# Patient Record
Sex: FEMALE | Race: WHITE | Employment: OTHER | URBAN - METROPOLITAN AREA
[De-identification: names, ages, dates, MRNs, and addresses within clinical notes are randomized per-mention and may not be internally consistent; named-entity substitution may affect disease eponyms.]

---

## 2017-03-03 ENCOUNTER — HOSPITAL ENCOUNTER (OUTPATIENT)
Dept: MAMMOGRAPHY | Age: 67
Discharge: HOME | End: 2017-03-03
Admitting: OBSTETRICS & GYNECOLOGY

## 2017-03-03 DIAGNOSIS — Z12.31 ENCOUNTER FOR SCREENING MAMMOGRAM FOR BREAST CANCER: ICD-10-CM

## 2017-03-09 ENCOUNTER — HOSPITAL ENCOUNTER (OUTPATIENT)
Dept: MAMMOGRAPHY | Age: 67
Discharge: HOME | End: 2017-03-09
Admitting: OBSTETRICS & GYNECOLOGY

## 2017-03-09 ENCOUNTER — HOSPITAL ENCOUNTER (OUTPATIENT)
Dept: ULTRASOUND IMAGING | Age: 67
Discharge: HOME | End: 2017-03-09

## 2017-03-09 DIAGNOSIS — R92.8 ABNORMAL MAMMOGRAM, UNSPECIFIED: ICD-10-CM

## 2017-03-10 ENCOUNTER — OFFICE VISIT (OUTPATIENT)
Dept: ORTHOPEDIC SURGERY | Age: 67
End: 2017-03-10

## 2017-03-10 VITALS — DIASTOLIC BLOOD PRESSURE: 73 MMHG | HEART RATE: 72 BPM | SYSTOLIC BLOOD PRESSURE: 121 MMHG

## 2017-03-10 DIAGNOSIS — M79.642 PAIN OF LEFT HAND: Primary | ICD-10-CM

## 2017-03-10 DIAGNOSIS — M79.641 PAIN IN RIGHT HAND: ICD-10-CM

## 2017-03-10 DIAGNOSIS — M18.0 PRIMARY OSTEOARTHRITIS OF BOTH FIRST CARPOMETACARPAL JOINTS: ICD-10-CM

## 2017-03-10 PROCEDURE — 99203 OFFICE O/P NEW LOW 30 MIN: CPT | Performed by: ORTHOPAEDIC SURGERY

## 2017-03-10 PROCEDURE — 73130 X-RAY EXAM OF HAND: CPT | Performed by: ORTHOPAEDIC SURGERY

## 2017-03-10 PROCEDURE — L3918 METACARP FX ORTHOSIS PRE OTS: HCPCS | Performed by: ORTHOPAEDIC SURGERY

## 2018-04-16 ENCOUNTER — HOSPITAL ENCOUNTER (OUTPATIENT)
Dept: MAMMOGRAPHY | Age: 68
Discharge: HOME | End: 2018-04-16
Attending: INTERNAL MEDICINE | Admitting: OBSTETRICS & GYNECOLOGY

## 2018-04-16 DIAGNOSIS — Z12.39 BREAST CANCER SCREENING: ICD-10-CM

## 2019-07-09 ENCOUNTER — HOSPITAL ENCOUNTER (OUTPATIENT)
Dept: MAMMOGRAPHY | Age: 69
Discharge: HOME | End: 2019-07-09
Payer: MEDICARE

## 2019-07-09 DIAGNOSIS — Z12.39 BREAST CANCER SCREENING: ICD-10-CM

## 2019-07-09 PROCEDURE — 77063 BREAST TOMOSYNTHESIS BI: CPT

## 2020-09-28 ENCOUNTER — HOSPITAL ENCOUNTER (OUTPATIENT)
Dept: MAMMOGRAPHY | Age: 70
Discharge: HOME | End: 2020-09-28
Payer: MEDICARE

## 2020-09-28 PROCEDURE — 77067 SCR MAMMO BI INCL CAD: CPT

## 2022-12-03 ENCOUNTER — TELEPHONE (OUTPATIENT)
Dept: ENDOCRINOLOGY | Age: 72
End: 2022-12-03

## 2022-12-03 NOTE — TELEPHONE ENCOUNTER
New patient referral. Please send a Health History Form to return to my attention and let them know I will look for appt day and time after receiving it. Thanks. Referred by Dr. Jose Andrade  [facility])

## 2023-04-24 ENCOUNTER — HOSPITAL ENCOUNTER (OUTPATIENT)
Dept: CARDIAC REHAB | Age: 73
Setting detail: THERAPIES SERIES
Discharge: HOME | End: 2023-04-24
Payer: MEDICARE

## 2023-04-26 ENCOUNTER — HOSPITAL ENCOUNTER (OUTPATIENT)
Dept: CARDIAC REHAB | Age: 73
Setting detail: THERAPIES SERIES
Discharge: HOME | End: 2023-04-26
Payer: MEDICARE

## 2023-04-26 PROCEDURE — G0239 OTH RESP PROC, GROUP: HCPCS

## 2023-04-28 ENCOUNTER — HOSPITAL ENCOUNTER (OUTPATIENT)
Dept: CARDIAC REHAB | Age: 73
Setting detail: THERAPIES SERIES
Discharge: HOME | End: 2023-04-28
Payer: MEDICARE

## 2023-04-28 PROCEDURE — G0239 OTH RESP PROC, GROUP: HCPCS

## 2023-05-03 ENCOUNTER — HOSPITAL ENCOUNTER (OUTPATIENT)
Dept: CARDIAC REHAB | Age: 73
Setting detail: THERAPIES SERIES
Discharge: HOME | End: 2023-05-03
Payer: MEDICARE

## 2023-05-03 PROCEDURE — G0239 OTH RESP PROC, GROUP: HCPCS

## 2023-05-10 ENCOUNTER — APPOINTMENT (OUTPATIENT)
Dept: CARDIAC REHAB | Age: 73
End: 2023-05-10
Payer: MEDICARE

## 2023-05-12 ENCOUNTER — APPOINTMENT (OUTPATIENT)
Dept: CARDIAC REHAB | Age: 73
End: 2023-05-12
Payer: MEDICARE

## 2023-05-15 ENCOUNTER — APPOINTMENT (OUTPATIENT)
Dept: CARDIAC REHAB | Age: 73
End: 2023-05-15
Payer: MEDICARE

## 2023-05-17 ENCOUNTER — APPOINTMENT (OUTPATIENT)
Dept: CARDIAC REHAB | Age: 73
End: 2023-05-17
Payer: MEDICARE

## 2023-05-19 ENCOUNTER — APPOINTMENT (OUTPATIENT)
Dept: CARDIAC REHAB | Age: 73
End: 2023-05-19
Payer: MEDICARE

## 2023-05-22 ENCOUNTER — APPOINTMENT (OUTPATIENT)
Dept: CARDIAC REHAB | Age: 73
End: 2023-05-22
Payer: MEDICARE

## 2023-05-24 ENCOUNTER — APPOINTMENT (OUTPATIENT)
Dept: CARDIAC REHAB | Age: 73
End: 2023-05-24
Payer: MEDICARE

## 2023-05-26 ENCOUNTER — APPOINTMENT (OUTPATIENT)
Dept: CARDIAC REHAB | Age: 73
End: 2023-05-26
Payer: MEDICARE

## 2023-05-31 ENCOUNTER — APPOINTMENT (OUTPATIENT)
Dept: CARDIAC REHAB | Age: 73
End: 2023-05-31
Payer: MEDICARE

## 2024-03-06 RX ORDER — ALENDRONATE SODIUM 70 MG/1
70 TABLET ORAL
COMMUNITY

## 2024-03-06 RX ORDER — ACETAMINOPHEN 160 MG
2000 TABLET,DISINTEGRATING ORAL DAILY
COMMUNITY

## 2024-03-06 NOTE — PROGRESS NOTES
[facility] PRE-OPERATIVE INSTRUCTIONS     Arrival time: 0750  Surgery time:0920      Take the following medications with a sip of water:  Follow your MD/Surgeons pre-procedure instructions regarding your medications     Do not eat or drink anything after 12:00 midnight prior to your surgery.  This includes water chewing gum, mints and ice chips.   You may brush your teeth and gargle the morning of your surgery, but do not swallow the water     Please see your family doctor/pediatrician for a history and physical and/or concerning medications.   Bring any test results/reports from your physicians office.   If you are under the care of a heart doctor or specialist doctor, please be aware that you may be asked to them for clearance    You may be asked to stop blood thinners such as Coumadin, Plavix, Fragmin, Lovenox, etc., or any anti-inflammatories such as:  Aspirin, Ibuprofen, Advil, Naproxen prior to your surgery.    We also ask that you stop any OTC medications such as fish oil, vitamin E, glucosamine, garlic, Multivitamins, COQ 10, etc.    We ask that you do not smoke 24 hours prior to surgery  We ask that you do not  drink any alcoholic beverages 24 hours prior to surgery     You must make arrangements for a responsible adult to take you home after your surgery.    For your safety you will not be allowed to leave alone or drive yourself home.  Your surgery will be cancelled if you do not have a ride home.     Also for your safety, it is strongly suggested that someone stay with you the first 24 hours after your surgery.     A parent or legal guardian must accompany a child scheduled for surgery and plan to stay at the hospital until the child is discharged.    Please do not bring other children with you.    For your comfort, please wear simple loose fitting clothing to the hospital.  Please do not bring valuables.    Do not wear any make-up or nail polish on your fingers or toes      For your safety,

## 2024-03-13 ENCOUNTER — ANESTHESIA EVENT (OUTPATIENT)
Dept: SURGERY | Age: 74
End: 2024-03-13
Payer: MEDICARE

## 2024-03-14 NOTE — PERIOP NOTE
[address] [facility] [address] [address]  [phone]        Pre-Op Phone Call:     Patient Name: Katia Soler     Telephone Information:   Mobile [phone]     Home phone:  [phone]    Surgery Time:    8:50 AM     Arrival Time:  7:20     Left extended Message:  Yes     Message left with: Spoke with patient     Recent change in health status:  No     Advised of transportation/ policy:  Yes     NPO policy reviewed:  Yes     Advised to take morning heart/blood pressure medications with sips of water morning of surgery?  Yes     Instructed to bring eye drops, photo identification, and insurance card day of surgery?  Yes     Advised to wear short sleeved button down shirt (no T-shirt underneath):  Yes     Advised not to wear jewelry, hairpins, or pantyhose day of surgery?  Yes     Advised not to wear make-up and to wash face day of surgery?  Yes    Remarks: Spoke with patient         Electronically signed by:  Janny Carrillo RN at 3/14/2024 10:45 AM

## 2024-03-15 ENCOUNTER — PREP FOR PROCEDURE (OUTPATIENT)
Dept: OPHTHALMOLOGY | Age: 74
End: 2024-03-15

## 2024-03-15 ENCOUNTER — HOSPITAL ENCOUNTER (OUTPATIENT)
Age: 74
Setting detail: OUTPATIENT SURGERY
Discharge: HOME | End: 2024-03-15
Attending: OPHTHALMOLOGY | Admitting: OPHTHALMOLOGY
Payer: MEDICARE

## 2024-03-15 ENCOUNTER — ANESTHESIA (OUTPATIENT)
Dept: SURGERY | Age: 74
End: 2024-03-15
Payer: MEDICARE

## 2024-03-15 VITALS
BODY MASS INDEX: 21.22 KG/M2 | OXYGEN SATURATION: 100 % | RESPIRATION RATE: 14 BRPM | WEIGHT: 140 LBS | HEIGHT: 68 IN | HEART RATE: 68 BPM | DIASTOLIC BLOOD PRESSURE: 81 MMHG | TEMPERATURE: 97.2 F | SYSTOLIC BLOOD PRESSURE: 137 MMHG

## 2024-03-15 PROBLEM — H25.12 NUCLEAR SCLEROTIC CATARACT OF LEFT EYE: Status: RESOLVED | Noted: 2024-03-15 | Resolved: 2024-03-15

## 2024-03-15 PROBLEM — H25.12 NUCLEAR SCLEROTIC CATARACT OF LEFT EYE: Status: ACTIVE | Noted: 2024-03-15

## 2024-03-15 PROCEDURE — 6360000002 HC RX W HCPCS: Performed by: NURSE ANESTHETIST, CERTIFIED REGISTERED

## 2024-03-15 PROCEDURE — 3700000001 HC ADD 15 MINUTES (ANESTHESIA): Performed by: OPHTHALMOLOGY

## 2024-03-15 PROCEDURE — 7100000010 HC PHASE II RECOVERY - FIRST 15 MIN: Performed by: OPHTHALMOLOGY

## 2024-03-15 PROCEDURE — 2580000003 HC RX 258: Performed by: OPHTHALMOLOGY

## 2024-03-15 PROCEDURE — 6360000002 HC RX W HCPCS: Performed by: OPHTHALMOLOGY

## 2024-03-15 PROCEDURE — 2709999900 HC NON-CHARGEABLE SUPPLY: Performed by: OPHTHALMOLOGY

## 2024-03-15 PROCEDURE — 6370000000 HC RX 637 (ALT 250 FOR IP): Performed by: OPHTHALMOLOGY

## 2024-03-15 PROCEDURE — 2500000003 HC RX 250 WO HCPCS: Performed by: OPHTHALMOLOGY

## 2024-03-15 PROCEDURE — 3700000000 HC ANESTHESIA ATTENDED CARE: Performed by: OPHTHALMOLOGY

## 2024-03-15 PROCEDURE — 3600000004 HC SURGERY LEVEL 4 BASE: Performed by: OPHTHALMOLOGY

## 2024-03-15 PROCEDURE — V2632 POST CHMBR INTRAOCULAR LENS: HCPCS | Performed by: OPHTHALMOLOGY

## 2024-03-15 PROCEDURE — 3600000014 HC SURGERY LEVEL 4 ADDTL 15MIN: Performed by: OPHTHALMOLOGY

## 2024-03-15 RX ORDER — TETRACAINE HYDROCHLORIDE 5 MG/ML
SOLUTION OPHTHALMIC
Status: COMPLETED | OUTPATIENT
Start: 2024-03-15 | End: 2024-03-15

## 2024-03-15 RX ORDER — PHENYLEPHRINE HYDROCHLORIDE 25 MG/ML
1 SOLUTION/ DROPS OPHTHALMIC SEE ADMIN INSTRUCTIONS
Status: COMPLETED | OUTPATIENT
Start: 2024-03-15 | End: 2024-03-15

## 2024-03-15 RX ORDER — SODIUM CHLORIDE 0.9 % (FLUSH) 0.9 %
5-40 SYRINGE (ML) INJECTION PRN
Status: CANCELLED | OUTPATIENT
Start: 2024-03-15

## 2024-03-15 RX ORDER — SODIUM CHLORIDE 9 MG/ML
INJECTION, SOLUTION INTRAVENOUS PRN
Status: DISCONTINUED | OUTPATIENT
Start: 2024-03-15 | End: 2024-03-15 | Stop reason: HOSPADM

## 2024-03-15 RX ORDER — SODIUM CHLORIDE 0.9 % (FLUSH) 0.9 %
5-40 SYRINGE (ML) INJECTION PRN
Status: DISCONTINUED | OUTPATIENT
Start: 2024-03-15 | End: 2024-03-15 | Stop reason: HOSPADM

## 2024-03-15 RX ORDER — PHENYLEPHRINE HYDROCHLORIDE 25 MG/ML
1 SOLUTION/ DROPS OPHTHALMIC SEE ADMIN INSTRUCTIONS
Status: CANCELLED | OUTPATIENT
Start: 2024-03-15

## 2024-03-15 RX ORDER — OFLOXACIN 3 MG/ML
SOLUTION/ DROPS OPHTHALMIC
Status: COMPLETED | OUTPATIENT
Start: 2024-03-15 | End: 2024-03-15

## 2024-03-15 RX ORDER — ONDANSETRON 2 MG/ML
4 INJECTION INTRAMUSCULAR; INTRAVENOUS
Status: CANCELLED | OUTPATIENT
Start: 2024-03-15 | End: 2024-03-16

## 2024-03-15 RX ORDER — LIDOCAINE HYDROCHLORIDE 10 MG/ML
INJECTION, SOLUTION EPIDURAL; INFILTRATION; INTRACAUDAL; PERINEURAL
Status: COMPLETED | OUTPATIENT
Start: 2024-03-15 | End: 2024-03-15

## 2024-03-15 RX ORDER — MIDAZOLAM HYDROCHLORIDE 1 MG/ML
INJECTION INTRAMUSCULAR; INTRAVENOUS PRN
Status: DISCONTINUED | OUTPATIENT
Start: 2024-03-15 | End: 2024-03-15 | Stop reason: SDUPTHER

## 2024-03-15 RX ORDER — CIPROFLOXACIN HYDROCHLORIDE 3.5 MG/ML
1 SOLUTION/ DROPS TOPICAL SEE ADMIN INSTRUCTIONS
Status: COMPLETED | OUTPATIENT
Start: 2024-03-15 | End: 2024-03-15

## 2024-03-15 RX ORDER — SODIUM CHLORIDE 9 MG/ML
INJECTION, SOLUTION INTRAVENOUS PRN
Status: CANCELLED | OUTPATIENT
Start: 2024-03-15

## 2024-03-15 RX ORDER — KETOROLAC TROMETHAMINE 5 MG/ML
1 SOLUTION OPHTHALMIC SEE ADMIN INSTRUCTIONS
Status: COMPLETED | OUTPATIENT
Start: 2024-03-15 | End: 2024-03-15

## 2024-03-15 RX ORDER — CIPROFLOXACIN HYDROCHLORIDE 3.5 MG/ML
1 SOLUTION/ DROPS TOPICAL SEE ADMIN INSTRUCTIONS
Status: CANCELLED | OUTPATIENT
Start: 2024-03-15

## 2024-03-15 RX ORDER — PREDNISOLONE ACETATE 10 MG/ML
SUSPENSION/ DROPS OPHTHALMIC
Status: COMPLETED | OUTPATIENT
Start: 2024-03-15 | End: 2024-03-15

## 2024-03-15 RX ORDER — SODIUM CHLORIDE 0.9 % (FLUSH) 0.9 %
5-40 SYRINGE (ML) INJECTION EVERY 12 HOURS SCHEDULED
Status: DISCONTINUED | OUTPATIENT
Start: 2024-03-15 | End: 2024-03-15 | Stop reason: HOSPADM

## 2024-03-15 RX ORDER — KETOROLAC TROMETHAMINE 5 MG/ML
1 SOLUTION OPHTHALMIC SEE ADMIN INSTRUCTIONS
Status: CANCELLED | OUTPATIENT
Start: 2024-03-15

## 2024-03-15 RX ORDER — BRIMONIDINE TARTRATE 1.5 MG/ML
SOLUTION/ DROPS OPHTHALMIC
Status: COMPLETED | OUTPATIENT
Start: 2024-03-15 | End: 2024-03-15

## 2024-03-15 RX ORDER — TROPICAMIDE 10 MG/ML
1 SOLUTION/ DROPS OPHTHALMIC SEE ADMIN INSTRUCTIONS
Status: COMPLETED | OUTPATIENT
Start: 2024-03-15 | End: 2024-03-15

## 2024-03-15 RX ORDER — KETOROLAC TROMETHAMINE 5 MG/ML
SOLUTION OPHTHALMIC
Status: COMPLETED | OUTPATIENT
Start: 2024-03-15 | End: 2024-03-15

## 2024-03-15 RX ORDER — SODIUM CHLORIDE 0.9 % (FLUSH) 0.9 %
5-40 SYRINGE (ML) INJECTION EVERY 12 HOURS SCHEDULED
Status: CANCELLED | OUTPATIENT
Start: 2024-03-15

## 2024-03-15 RX ORDER — NALOXONE HYDROCHLORIDE 0.4 MG/ML
INJECTION, SOLUTION INTRAMUSCULAR; INTRAVENOUS; SUBCUTANEOUS PRN
Status: CANCELLED | OUTPATIENT
Start: 2024-03-15

## 2024-03-15 RX ORDER — TROPICAMIDE 10 MG/ML
1 SOLUTION/ DROPS OPHTHALMIC SEE ADMIN INSTRUCTIONS
Status: CANCELLED | OUTPATIENT
Start: 2024-03-15

## 2024-03-15 RX ADMIN — KETOROLAC TROMETHAMINE 1 DROP: 5 SOLUTION/ DROPS OPHTHALMIC at 08:05

## 2024-03-15 RX ADMIN — TROPICAMIDE 1 DROP: 10 SOLUTION/ DROPS OPHTHALMIC at 08:10

## 2024-03-15 RX ADMIN — CIPROFLOXACIN 1 DROP: 3 SOLUTION OPHTHALMIC at 08:10

## 2024-03-15 RX ADMIN — KETOROLAC TROMETHAMINE 1 DROP: 5 SOLUTION/ DROPS OPHTHALMIC at 08:10

## 2024-03-15 RX ADMIN — PHENYLEPHRINE HYDROCHLORIDE 1 DROP: 25 SOLUTION/ DROPS OPHTHALMIC at 08:05

## 2024-03-15 RX ADMIN — MIDAZOLAM 0.5 MG: 1 INJECTION INTRAMUSCULAR; INTRAVENOUS at 09:31

## 2024-03-15 RX ADMIN — PHENYLEPHRINE HYDROCHLORIDE 1 DROP: 25 SOLUTION/ DROPS OPHTHALMIC at 08:00

## 2024-03-15 RX ADMIN — PHENYLEPHRINE HYDROCHLORIDE 1 DROP: 25 SOLUTION/ DROPS OPHTHALMIC at 08:10

## 2024-03-15 RX ADMIN — MIDAZOLAM 1 MG: 1 INJECTION INTRAMUSCULAR; INTRAVENOUS at 09:37

## 2024-03-15 RX ADMIN — TROPICAMIDE 1 DROP: 10 SOLUTION/ DROPS OPHTHALMIC at 08:05

## 2024-03-15 RX ADMIN — CIPROFLOXACIN 1 DROP: 3 SOLUTION OPHTHALMIC at 08:05

## 2024-03-15 RX ADMIN — CIPROFLOXACIN 1 DROP: 3 SOLUTION OPHTHALMIC at 08:00

## 2024-03-15 RX ADMIN — TROPICAMIDE 1 DROP: 10 SOLUTION/ DROPS OPHTHALMIC at 08:00

## 2024-03-15 RX ADMIN — MIDAZOLAM 0.5 MG: 1 INJECTION INTRAMUSCULAR; INTRAVENOUS at 09:34

## 2024-03-15 RX ADMIN — SODIUM CHLORIDE: 9 INJECTION, SOLUTION INTRAVENOUS at 08:05

## 2024-03-15 RX ADMIN — KETOROLAC TROMETHAMINE 1 DROP: 5 SOLUTION/ DROPS OPHTHALMIC at 08:00

## 2024-03-15 ASSESSMENT — PAIN SCALES - GENERAL
PAINLEVEL_OUTOF10: 0
PAINLEVEL_OUTOF10: 0

## 2024-03-15 ASSESSMENT — PAIN - FUNCTIONAL ASSESSMENT: PAIN_FUNCTIONAL_ASSESSMENT: 0-10

## 2024-03-15 ASSESSMENT — ENCOUNTER SYMPTOMS: SHORTNESS OF BREATH: 1

## 2024-03-15 NOTE — ANESTHESIA POSTPROCEDURE EVALUATION
Department of Anesthesiology  Postprocedure Note    Patient: Katia Soler  MRN: [id number]  YOB: 1950  Date of evaluation: 3/15/2024    Procedure Summary       Date: 03/15/24 Room / Location: [address]    Anesthesia Start: 0927 Anesthesia Stop: 1002    Procedure: PHACOEMULSIFICATION WITH INTRAOCULAR LENS IMPLANT - LEFT EYE (Left: Eye) Diagnosis:       Nuclear sclerotic cataract of left eye      (Nuclear sclerotic cataract of left eye [H25.12])    Surgeons: Noble Richardson MD Responsible Provider: Naz Edwards MD    Anesthesia Type: MAC ASA Status: 2            Anesthesia Type: No value filed.    Desirae Phase I: Desirae Score: 10    Desirae Phase II: Desirae Score: 10    Anesthesia Post Evaluation    Patient location during evaluation: PACU  Patient participation: complete - patient participated  Level of consciousness: awake and alert  Airway patency: patent  Nausea & Vomiting: no nausea and no vomiting  Cardiovascular status: hemodynamically stable  Respiratory status: acceptable  Hydration status: stable  Pain management: adequate    No notable events documented.

## 2024-03-15 NOTE — DISCHARGE INSTRUCTIONS
[facility]   [address], [place] [address]   [phone]       Post-Operative Instructions for Day of Cataract Surgery    @ED@    Patient Name: Katia Soler  : 1950  MRN: [id number]    Use the Antibiotic Drop, Ofloxacin,  one drop in the Left Eye THREE  more times today then FOUR times daily beginning tomorrow. .  Use the non-steroidal anti-inflammatory drop, Ketoralac  one drop in the Left eye THREE  more times today then FOUR times daily beginning tomorrow.  Use the steroid drop, prednisolone,  one drop in the Left Eye THREE more times today then FOUR times daily beginning tomorrow.  You may watch TV, walk, and do routine chores. Avoid heavy exertion or straining.  Wear shield over operative eye at bedtime for ONE week.  You may take Tylenol for mild irritation or pain. If you have pain worse than what Tylenol can manage, call your physician  Your next appointment is tomorrow  at the  location. Remember to bring your eye medications and eye drop instruction sheet to each appoinment.         General Post-Operative Instructions for Cataract Surgery    COMFORT - Your eye may feel irritated (scratchy, stinging, or achy) this is normal.   DIET - You may resume your regular diet, no alcohol for 24 hours.  ACTIVITY - Do not lift anything over 5 pounds today for the first week. No driving for 24 hours.  EYE CARE - Use your eye drops as instructed. Wash your hands before using your eye drops. Do not bump, rub, or touch the operative eye. No water in or around your eyes, or showering for 24 hours.  SHIELD - Wear the eye shield for 7 nights following surgery.  VISION - You may experience off/on blurry vision today. Your vision will steadily improve over the next days. Call your surgeon if you experience a significant decrease in your vision. Be careful when walking, especially with steps. You may experience double vision until the anesthetic fully wears off.    Bring your

## 2024-03-15 NOTE — H&P
Date of Surgery Update:  Katialexii Duenasn was seen, history and physical examination reviewed, and patient examined by me today. There have been no significant clinical changes since the completion of the previous history and physical.    Electronically signed by: Noble Richardson MD,3/15/2024,9:25 AM

## 2024-03-15 NOTE — ANESTHESIA PRE PROCEDURE
reviewed (including anesthesia, drug and allergy history).  No interval changes to history and physical examination.  Anesthetic plan, risks, benefits, alternatives, and personnel involved discussed with patient.  Patient verbalized an understanding and agrees to proceed.      Naz Edwards MD  March 15, 2024  8:27 AM     Naz Edwards MD   3/15/2024

## 2024-03-15 NOTE — OP NOTE
[address] [facility] in [place], [place].    11/9/2020     Mario Mayes presents to the [address] [place] [address], for management of anticoagulation therapy. Anticoagulation Indications  (Z51.81,  Z79.01) Encounter for monitoring Coumadin therapy  (primary encounter diagnosis)  Plan: POCT PT/INR, ANTICOAGULANT MANAGEMENT FOR         PATIENT ON WARFARIN    (Z86.73) Personal history of TIA (transient ischemic attack)  Plan: POCT PT/INR, ANTICOAGULANT MANAGEMENT FOR         PATIENT ON WARFARIN    (I66.9) Cerebral thrombosis  Plan: POCT PT/INR, ANTICOAGULANT MANAGEMENT FOR         PATIENT ON WARFARIN       Current Patient Status   Patient continues on the Prednisone until Friday. Patient's INR today is 2.3; which is within his therapeutic range of 2.0-3.0. Current dosing of Warfarin (Generic Name Medication) was verified; patient reports he has been taking it as prescribed. Patient denies missing any doses or taking extra doses of the anticoagulant. Other Medications  All medications were reviewed, including herbals, supplements, and over the counter medications. Patient reported no medication changes. Dietary   Patient denies dietary changes. Reviewed with patient the importance of being consistent with Vitamin K foods; patient verbalized understanding. Alcohol   Patient denies changes in alcohol consumption. Reviewed with patient that alcohol has the potential to alter INR results; patient verbalized understanding. Review of Systems   Â· SKIN: Patient denies any bruising or bleeding. No bruising or bleeding noted when the patient was in the clinic today. Â· PSYCHOLOGIC: Alert. Oriented x3 to person, place, and time. Patient is self-administering anticoagulation medication without issues or concerns. Â· OTHER: Patient denies noting any blood in nose, mouth/gums, urine or stool.     Mario Mayes was instructed to contact the Operative Note        [facility]  [address] [facility] [facility] [address]  [address]   [place] [address]  [phone]      3/15/2024    Patient name: Katia Soler  YOB: 1950  MRN: [id number]    [place]eriges: No Known Allergies    Pre-operative diagnosis:  Cataract Left Eye    Post-operative diagnosis:  Same    Procedure Performed:  Phacoemulsification with insertion of a foldable posterior chamber intraocular lens implant to the left eye.    Anesthesia:  Topical Anesthesia with MAC.     Estimated Blood Loss: None    Specimens removed: None    Limbal Relaxing Incisions:  Axis:N/A    Arc Length: N/A    Toric Axis: N/A    Complications:  None    Description of Procedure: In the same day surgery center, the patient was given the usual pre-operative regimen.  In the operating room suite, the left eye was prepped and draped in the usual sterile fashion.  A paracentesis tract was fashioned, after which 0.5 MI of 1% preservative free Lidocaine was injected into the anterior chamber. Trypan blue dye was instilled into the anterior chamber. The anterior chamber was irrigated with balanced salt solution. Viscoat was instilled into the anterior chamber for a complete chamber fill. A crescent blade was used to develop a clear corneal tunnel a clear cornea temporal tunnel, and the anterior chamber was entered using a keratome. Under optimal magnification and visualization, a continuous curvilinear capsulorrhexis was performed.  Hydro-dissection of the lens was carried out using balanced salt solution. The lens was then phacoemulsified using the divide and conquer technique. Residual cortex was removed using the I/A handpiece followed by thorough posterior capsule polishing. The anterior chamber and capsular bag were then filled with Provisc. The lens was gently delivered into the capsular bag and was noted to be well centered. Provisc was removed using the I/A handpiece and the globe was pressurized using  anticoagulation clinic with any unusual bleeding or bruising concerns; any changes in medications, diet, health status, lifestyle; or any other questions or concerns. Patient was instructed to seek medical care immediately if any of the following symptoms are noticed: unusual or excessive bleeding; sudden onset of shortness of breath; chest pain; swelling, pain, redness, and/or warmth in any extremity. Plan of Care   Eddie Gibbs was informed that his INR result is within therapeutic range of 2.0-3.0. Per [facility] protocols,   Eddie Gibbs was instructed to continue current medication dosing schedule; which is listed below. Anticoagulation Summary  As of 2020    INR goal:  2.0-3.0   TTR:  87.6 % (7.7 y)   INR used for dosin.3 (2020)   Warfarin maintenance plan:  1.25 mg (2.5 mg x 0.5) every M, W, F; 2.5 mg (2.5 mg x 1) all other days   Weekly warfarin total:  13.75 mg   Plan last modified:  Elnora Pallas, RN (2020)   Next INR check:  2020   Target end date: Indefinite    Indications    Cerebral thrombosis without mention of cerebral infarction [I66.9]  Encounter for monitoring Coumadin therapy [Z51.81  Z79.01]  Personal history of TIA (transient ischemic attack) [Z86.73]             Anticoagulation Episode Summary     INR check location:  Anticoagulation Clinic    Preferred lab:      Send INR reminders to:  DARRON (OPEN ENROLLMENT) Justin    Comments:        Anticoagulation Care Providers     Provider Role Specialty Phone number    Jermaine Thompson DO Referring [facility] [phone]          Follow-Up  Patient was recommended to have his INR rechecked via [facility] clinic appointment in approximately 2 weeks. Patient was provided with a hard copy of the medication dosing schedule and instructions; as is documented in today's anticoagulation encounter. Patient education was provided and all questions were answered to patient's satisfaction.   Patient verbalized understanding of the provided information and discussion. Patient is in agreement with the discussed plan. Brian Dinero NP is in the office today supervising the treatment.           Orders Placed This Encounter   â¢ ANTICOAGULANT MANAGEMENT FOR PATIENT ON WARFARIN   â¢ POCT PT/INR       Cynthia Ocasio RN

## (undated) DEVICE — SOLUTION IRRIG 500ML STRL H2O NONPYROGENIC

## (undated) DEVICE — SURGICAL PROC PACK SHT WEST V4

## (undated) DEVICE — WIPE INSTR W73XL73CM VISITEC

## (undated) DEVICE — MICROSURGICAL INSTRUMENT ANTERIOR CHAMBER CANNULA 30GA: Brand: ALCON

## (undated) DEVICE — Device

## (undated) DEVICE — SURG GL, SENSICARE PI ORTHO LT,LF,PF,7.5: Brand: MEDLINE

## (undated) DEVICE — SYRINGE MED 30ML STD CLR PLAS LUERLOCK TIP N CTRL DISP

## (undated) DEVICE — SYRINGE MEDICAL 3ML CLEAR PLASTIC STANDARD NON CONTROL LUERLOCK TIP DISPOSABLE

## (undated) DEVICE — MICROSURGICAL INSTRUMENT VISCOAT CANNULA 27GA THREADED HUB: Brand: ALCON

## (undated) DEVICE — SYRINGE TB 1ML NDL 25GA L0.625IN PLAS SLIP TIP CONVENTIONAL

## (undated) DEVICE — STRIPS OPTHLMC BIOGLO SODIUM 1MG

## (undated) DEVICE — SOLUTION IRRIGATION BAL SALT SOLUTION 15 ML STRL BSS

## (undated) DEVICE — SOLUTION IRRIG BSS ST 500ML

## (undated) DEVICE — Device: Brand: MEDEX